# Patient Record
Sex: FEMALE | Race: BLACK OR AFRICAN AMERICAN | Employment: FULL TIME | URBAN - METROPOLITAN AREA
[De-identification: names, ages, dates, MRNs, and addresses within clinical notes are randomized per-mention and may not be internally consistent; named-entity substitution may affect disease eponyms.]

---

## 2017-03-11 ENCOUNTER — HOSPITAL ENCOUNTER (EMERGENCY)
Age: 27
Discharge: HOME | End: 2017-03-11
Attending: EMERGENCY MEDICINE
Payer: SELF-PAY

## 2017-03-11 ENCOUNTER — APPOINTMENT (OUTPATIENT)
Dept: GENERAL RADIOLOGY | Age: 27
End: 2017-03-11
Attending: EMERGENCY MEDICINE
Payer: SELF-PAY

## 2017-03-11 VITALS
HEIGHT: 62 IN | WEIGHT: 126.2 LBS | TEMPERATURE: 97.7 F | BODY MASS INDEX: 23.22 KG/M2 | DIASTOLIC BLOOD PRESSURE: 90 MMHG | OXYGEN SATURATION: 99 % | RESPIRATION RATE: 16 BRPM | SYSTOLIC BLOOD PRESSURE: 129 MMHG | HEART RATE: 99 BPM

## 2017-03-11 DIAGNOSIS — S60.221A CONTUSION OF RIGHT HAND, INITIAL ENCOUNTER: Primary | ICD-10-CM

## 2017-03-11 PROCEDURE — 73130 X-RAY EXAM OF HAND: CPT

## 2017-03-11 PROCEDURE — 99283 EMERGENCY DEPT VISIT LOW MDM: CPT

## 2017-03-11 PROCEDURE — L3908 WHO COCK-UP NONMOLDE PRE OTS: HCPCS

## 2017-03-11 RX ORDER — IBUPROFEN 600 MG/1
600 TABLET ORAL
Qty: 20 TAB | Refills: 0 | OUTPATIENT
Start: 2017-03-11 | End: 2020-07-10

## 2017-03-11 NOTE — ED NOTES
[facility] discharge instructions with patient. Opportunity for questions provided, patient verbalized understanding. Ambulatory with steady gait out of department.

## 2017-03-11 NOTE — ED TRIAGE NOTES
Patient arriving for c/o injury to RIGHT hand after punching wall. Patient reports abrasions to hand. Unsure if Tetanus is up to date. Reports being unable to make a fist with hand. Reports numbness and tingling to RIGHT hand. Able to move thumb without difficulty.

## 2017-03-11 NOTE — LETTER
[address] 
[address] [facility] 7 [id number] 
[phone] Work/School Note Date: 3/11/2017 To Whom It May concern: 
 
Clari Garsia was seen and treated today in the emergency room by the following provider(s): 
Attending Provider: David Jha MD 
Physician Assistant: Magnolia Fox PA-C. Clari Garsia may return to work on 3/14/17.  
 
Sincerely, 
 
 
 
 
Magnolia Fox PA-C

## 2017-03-11 NOTE — DISCHARGE INSTRUCTIONS
We hope that we have addressed all of your medical concerns. The examination and treatment you received in the Emergency Department were for an emergent problem and were not intended as complete care. It is important that you follow up with your healthcare provider(s) for ongoing care. If your symptoms worsen or do not improve as expected, and you are unable to reach your usual health care provider(s), you should return to the Emergency Department. Today's healthcare is undergoing tremendous change, and patient satisfaction surveys are one of the many tools to assess the quality of medical care. You may receive a survey from the [facility] regarding your experience in the Emergency Department. I hope that your experience has been completely positive, particularly the medical care that I provided. As such, please participate in the survey; anything less than excellent does not meet my expectations or intentions. [facility] and [address] [place] [address] participate in nationally recognized quality of care measures. If your blood pressure is greater than 120/80, as reported below, we urge that you seek medical care to address the potential of high blood pressure, commonly known as hypertension. Hypertension can be hereditary or can be caused by certain medical conditions, pain, stress, or \"white coat syndrome. \"       Please make an appointment with your health care provider(s) for follow up of your Emergency Department visit. VITALS:   Patient Vitals for the past 8 hrs:   Temp Pulse Resp BP SpO2   03/11/17 0016 97.7 °F (36.5 °C) 99 16 129/90 99 %          Thank you for allowing us to provide you with medical care today. We realize that you have many choices for your emergency care needs. Please choose us in the future for any continued health care needs. Mckenna Jacobson, [facility].   Office: [phone]            No results found for this or any previous visit (from the past 24 hour(s)). Xr Hand Rt Min 3 V    Result Date: 3/11/2017  EXAM:  CR hand min 3 views right INDICATION:  Right hand pain after punching wall COMPARISON: 4/11/2012. TECHNIQUE: 3 views right hand FINDINGS: There is no acute fracture or dislocation. The soft tissues are unremarkable. IMPRESSION: No acute abnormality. Hand Bruises: Care Instructions  Your Care Instructions  Bruises, or contusions, can happen as a result of an impact or fall. Most people think of a bruise as a black-and-blue spot. This happens when small blood vessels get torn and leak blood under the skin. The bruise may turn purplish black, reddish blue, or yellowish green as it heals. But bones and muscles can also get bruised. This may damage the hand but not cause a bruise that you can see. Most bruises aren't serious and will go away on their own in 2 to 4 weeks. But sometimes a more serious hand injury might not heal on its own. Tell your doctor if you have new symptoms or your injury is not getting better over time. You may have tests to see if you have bone or nerve damage. These tests may include X-rays, a CT scan, or an MRI. If you damaged bones or muscles, you may need more treatment. The doctor has checked you carefully, but problems can develop later. If you notice any problems or new symptoms, get medical treatment right away. Follow-up care is a key part of your treatment and safety. Be sure to make and go to all appointments, and call your doctor if you are having problems. It's also a good idea to know your test results and keep a list of the medicines you take. How can you care for yourself at home? · Put ice or a cold pack on the hand for 10 to 20 minutes at a time. Put a thin cloth between the ice and your skin. · Prop up your hand on a pillow when you ice it or anytime you sit or lie down during the next 3 days.  Try to keep your hand above the level of your heart. This will help reduce swelling. · Be safe with medicines. Read and follow all instructions on the label. ¨ If the doctor gave you a prescription medicine for pain, take it as prescribed. ¨ If you are not taking a prescription pain medicine, ask your doctor if you can take an over-the-counter medicine. · Be sure to follow your doctor's advice about moving and exercising your injured hand. When should you call for help? Call your doctor now or seek immediate medical care if:  · Your pain gets worse. · You have new or worse swelling. · You have tingling, weakness, or numbness in the area near the bruise. · The area near the bruise is cold or pale. · You have symptoms of infection, such as:  ¨ Increased pain, swelling, warmth, or redness. ¨ Red streaks leading from the area. ¨ Pus draining from the area. ¨ A fever. Watch closely for changes in your health, and be sure to contact your doctor if:  · You do not get better as expected. Where can you learn more? Go to http://daniel-oren.info/. Enter E844 in the search box to learn more about \"Hand Bruises: Care Instructions. \"  Current as of: May 27, 2016  Content Version: 11.1  © 7274-7391 [employer]. Care instructions adapted under license by [employer] (which disclaims liability or warranty for this information). If you have questions about a medical condition or this instruction, always ask your healthcare professional. [place] [address] any warranty or liability for your use of this information.

## 2017-03-11 NOTE — ED PROVIDER NOTES
HPI Comments: 61-year-old female presents to the emergency department for evaluation of a right hand pain. Patient states she punched a wall one day prior to arrival. Patient has had continued aching throbbing pain rated 9/10 in the right hand. It does not radiate. No wrist pain forearm or elbow pain. No nausea vomiting. No numbness or tingling. Patient has not taken anything for the pain. Pain is worse when she presses on her hand or moves her fingers. No alleviating factors  No HI or SI. Social hx  +smoker  Social alcohol    Patient is a 23 y.o. female presenting with hand injury. The history is provided by the patient. Hand Injury    Pertinent negatives include no back pain and no neck pain. History reviewed. No pertinent past medical history. History reviewed. No pertinent surgical history. History reviewed. No pertinent family history. Social History     Social History    Marital status: SINGLE     Spouse name: N/A    Number of children: N/A    Years of education: N/A     Occupational History    Not on file. Social History Main Topics    Smoking status: Former Smoker     Packs/day: 0.25    Smokeless tobacco: Not on file    Alcohol use Yes      Comment: socially    Drug use: No    Sexual activity: Yes     Partners: Female, Male     Birth control/ protection: Injection     Other Topics Concern    Not on file     Social History Narrative         ALLERGIES: Review of patient's allergies indicates no known allergies. Review of Systems   Constitutional: Negative for chills and fever. HENT: Negative for congestion. Respiratory: Negative for cough and chest tightness. Cardiovascular: Negative for chest pain. Gastrointestinal: Negative for nausea and vomiting. Musculoskeletal: Negative for back pain and neck pain. Right hand pain   Psychiatric/Behavioral: Negative for self-injury and suicidal ideas. The patient is not nervous/anxious.         Vitals:    03/11/17 0016   BP: 129/90   Pulse: 99   Resp: 16   Temp: 97.7 °F (36.5 °C)   SpO2: 99%   Weight: 57.2 kg (126 lb 3.2 oz)   Height: 5' 2\" (1.575 m)            Physical Exam   Constitutional: She is oriented to person, place, and time. She appears well-developed and well-nourished. No distress. HENT:   Head: Normocephalic and atraumatic. Eyes: Conjunctivae and EOM are normal. Pupils are equal, round, and reactive to light. Neck: Normal range of motion. Neck supple. NO midline tenderness to palpation of cspine   Cardiovascular: Normal rate and regular rhythm. Pulmonary/Chest: Effort normal. No respiratory distress. Musculoskeletal: Normal range of motion. She exhibits no edema or tenderness. Right  Wrist: no redness, warmth,  No obvious deformity. No soft tissue swelling, no ecchymosis. No pain with palpation along over carpals. No snuffbox or axial load pain. 2+ radial pulse, cap refill brisk < 3 seconds. Pt tender along dorsum of hand over all metacarpals. No deformity. No soft tissue swelling. Sensation intact to all fingers. Pain with flexion of fingers. 5/5 ab/adduction of fingers  Neurovascularly intact   Neurological: She is alert and oriented to person, place, and time. No cranial nerve deficit. She exhibits normal muscle tone. Coordination normal.   Skin: Skin is warm and dry. No erythema. Psychiatric: She has a normal mood and affect. Her behavior is normal. Judgment and thought content normal.   Nursing note and vitals reviewed. MDM  Number of Diagnoses or Management Options  Contusion of right hand, initial encounter:   Diagnosis management comments: 21-year-old female with right hand injury after punching a wall. No open wounds or deformities. Patient is neurovascularly intact  Contusion versus sprain versus fracture  Plan: X-ray    X-rays negative for acute bony process. I will place the hand in a splint for comfort the patient follow up with orthopedic doctor.     Patient's results have been reviewed with them. Patient and/or family have verbally conveyed their understanding and agreement of the patient's signs, symptoms, diagnosis, treatment and prognosis and additionally agree to follow up as recommended or return to the Emergency Room should their condition change prior to follow-up. Discharge instructions have also been provided to the patient with some educational information regarding their diagnosis as well a list of reasons why they would want to return to the ER prior to their follow-up appointment should their condition change. Amount and/or Complexity of Data Reviewed  Tests in the radiology section of CPT®: ordered and reviewed (xr right hand)    Patient Progress  Patient progress: stable    ED Course       Procedures           Pt case including HPI, PE, and all available lab and radiology results has been discussed with attending physician. Opportunity to evaluate patient has been provided to ER attending. Discharge and prescription plan has been agreed upon.

## 2020-07-10 ENCOUNTER — HOSPITAL ENCOUNTER (EMERGENCY)
Age: 30
Discharge: HOME | End: 2020-07-10
Attending: EMERGENCY MEDICINE
Payer: SELF-PAY

## 2020-07-10 VITALS
RESPIRATION RATE: 16 BRPM | OXYGEN SATURATION: 97 % | DIASTOLIC BLOOD PRESSURE: 72 MMHG | SYSTOLIC BLOOD PRESSURE: 137 MMHG | HEART RATE: 81 BPM | TEMPERATURE: 98.3 F

## 2020-07-10 DIAGNOSIS — K04.01 ACUTE PULPITIS: Primary | ICD-10-CM

## 2020-07-10 DIAGNOSIS — K04.7 TOOTH ABSCESS: ICD-10-CM

## 2020-07-10 PROCEDURE — 99282 EMERGENCY DEPT VISIT SF MDM: CPT

## 2020-07-10 RX ORDER — IBUPROFEN 800 MG/1
800 TABLET ORAL
Qty: 20 TAB | Refills: 0 | Status: SHIPPED | OUTPATIENT
Start: 2020-07-10 | End: 2020-07-17

## 2020-07-10 RX ORDER — ACETAMINOPHEN 500 MG
1000 TABLET ORAL
Qty: 20 TAB | Refills: 0 | OUTPATIENT
Start: 2020-07-10 | End: 2020-08-27

## 2020-07-10 RX ORDER — PENICILLIN V POTASSIUM 500 MG/1
500 TABLET, FILM COATED ORAL 4 TIMES DAILY
Qty: 28 TAB | Refills: 0 | Status: SHIPPED | OUTPATIENT
Start: 2020-07-10 | End: 2020-07-17

## 2020-07-10 NOTE — ED NOTES
I have reviewed discharge instructions with the patient. The patient verbalized understanding.  Patient provided with work note

## 2020-07-10 NOTE — ED PROVIDER NOTES
The history is provided by the patient. Dental Pain    This is a new problem. The current episode started more than 2 days ago. The problem occurs constantly. The problem has not changed since onset. The pain is located in the left lower mouth. The quality of the pain is aching. The pain is moderate. Associated symptoms include headaches. There was no fever. She has tried nothing for the symptoms. The patient has no cardiac history. History reviewed. No pertinent past medical history. History reviewed. No pertinent surgical history. History reviewed. No pertinent family history.     Social History     Socioeconomic History    Marital status: SINGLE     Spouse name: Not on file    Number of children: Not on file    Years of education: Not on file    Highest education level: Not on file   Occupational History    Not on file   Social Needs    Financial resource strain: Not on file    Food insecurity     Worry: Not on file     Inability: Not on file    Transportation needs     Medical: Not on file     Non-medical: Not on file   Tobacco Use    Smoking status: Former Smoker     Packs/day: 0.25   Substance and Sexual Activity    Alcohol use: Yes     Comment: socially    Drug use: No    Sexual activity: Yes     Partners: Female, Male     Birth control/protection: Injection   Lifestyle    Physical activity     Days per week: Not on file     Minutes per session: Not on file    Stress: Not on file   Relationships    Social connections     Talks on phone: Not on file     Gets together: Not on file     Attends Buddhism service: Not on file     Active member of club or organization: Not on file     Attends meetings of clubs or organizations: Not on file     Relationship status: Not on file    Intimate partner violence     Fear of current or ex partner: Not on file     Emotionally abused: Not on file     Physically abused: Not on file     Forced sexual activity: Not on file   Other Topics Concern    Not on file   Social History Narrative    Not on file         ALLERGIES: Patient has no known allergies. Review of Systems   HENT: Positive for dental problem. All other systems reviewed and are negative. Vitals:    07/10/20 0756   BP: 137/72   Pulse: 81   Resp: 16   Temp: 98.3 °F (36.8 °C)   SpO2: 97%            Physical Exam  Vitals signs and nursing note reviewed. Constitutional:       General: She is not in acute distress. Appearance: She is well-developed. HENT:      Head: Normocephalic and atraumatic. Mouth/Throat:      Dentition: Abnormal dentition (with crwn fracture in center of tooth in longitudinal orientation and exposed pulp). Eyes:      Conjunctiva/sclera: Conjunctivae normal.   Neck:      Musculoskeletal: Neck supple. Cardiovascular:      Rate and Rhythm: Normal rate and regular rhythm. Pulmonary:      Effort: Pulmonary effort is normal. No respiratory distress. Abdominal:      General: There is no distension. Musculoskeletal: Normal range of motion. General: No deformity. Skin:     General: Skin is warm and dry. Neurological:      Mental Status: She is alert. Cranial Nerves: No cranial nerve deficit. Psychiatric:         Behavior: Behavior normal.          MDM     After thorough assessment of mouth and teeth, Pt appears to have dental caries with abscess that is not approachable via gingival I&D. No evidence of extension into mandible/maxilla, no sarah's angina, no systemic symptoms to suggest sepsis or endocarditis. Pt will be placed on antimicrobials and offered alveolar block for comfort in interim.  Pt was advised that definitive care for dental abscess is with a dental health professional.     Procedures

## 2020-07-10 NOTE — LETTER
[address] 
[address] [id number] 
[phone] Work/School Note Date: 7/10/2020 To Whom It May concern: 
 
Junie Abdalla was seen and treated today in the emergency room by the following provider(s): 
Attending Provider: Candy Rudd MD.   
 
Junie Abdalla may return to work on 7/11/2020.  
 
Sincerely, 
 
 
 
 
Fredi Arias MD

## 2020-08-27 ENCOUNTER — HOSPITAL ENCOUNTER (EMERGENCY)
Age: 30
Discharge: HOME | End: 2020-08-27
Attending: EMERGENCY MEDICINE

## 2020-08-27 VITALS
HEART RATE: 104 BPM | SYSTOLIC BLOOD PRESSURE: 132 MMHG | DIASTOLIC BLOOD PRESSURE: 91 MMHG | RESPIRATION RATE: 16 BRPM | BODY MASS INDEX: 25.03 KG/M2 | HEIGHT: 62 IN | WEIGHT: 136 LBS | OXYGEN SATURATION: 96 % | TEMPERATURE: 99.8 F

## 2020-08-27 DIAGNOSIS — K08.89 PAIN, DENTAL: Primary | ICD-10-CM

## 2020-08-27 PROCEDURE — 99282 EMERGENCY DEPT VISIT SF MDM: CPT

## 2020-08-27 RX ORDER — PENICILLIN V POTASSIUM 500 MG/1
500 TABLET, FILM COATED ORAL 4 TIMES DAILY
Qty: 40 TAB | Refills: 0 | Status: SHIPPED | OUTPATIENT
Start: 2020-08-27 | End: 2020-09-06

## 2020-08-27 NOTE — LETTER
[address] 
[address] Work/School Note Date: 8/27/2020 To Whom It May concern: 
 
Rosie Arzate was seen and treated today in the emergency room by the following provider(s): 
Attending Provider: Jewel Phillips MD 
Physician Assistant: Jazmine Dasilva PA-C. Rosie Arzate may return to work on 8/29/2020.  
 
Sincerely, 
 
 
 
 
Alfred Newby PA-C

## 2020-08-27 NOTE — DISCHARGE INSTRUCTIONS
Ibuprofen or tylenol as needed for pain. Penicillin:1 pill every 6 hours until complete. Please follow-up with one of the provided dental clinics for further treatment of your tooth pain. Return to ER for any fever over 101, facial swelling, facial, redness. Dental Pain: After Your Visit  Your Care Instructions  The most common cause of dental pain is tooth decay. It can also be caused by an infection of the tooth (abscess) or gum, a tooth that has not broken all the way through the gum (impacted tooth), or a problem with the nerve-filled center of the tooth. Follow-up care is a key part of your treatment and safety. Be sure to make and go to all appointments, and call your doctor if you are having problems. Its also a good idea to know your test results and keep a list of the medicines you take. How can you care for yourself at home? · Contact a dentist for follow-up care. · Put ice or a cold pack on the outside of your mouth for 10 to 20 minutes at a time to reduce pain and swelling. Put a thin cloth between the ice and your skin. · Take an over-the-counter pain medicine, such as acetaminophen (Tylenol), ibuprofen (Advil, Motrin), or naproxen (Aleve). Read and follow all instructions on the label. · Do not take two or more pain medicines at the same time unless the doctor told you to. Many pain medicines have acetaminophen, which is Tylenol. Too much acetaminophen (Tylenol) can be harmful. · Rinse your mouth with warm salt water every 2 hours to help relieve pain and swelling from an infected tooth. Mix 1 teaspoon of salt in 8 ounces of water. · If your doctor prescribed antibiotics, take them as directed. Do not stop taking them just because you feel better. You need to take the full course of antibiotics. When should you call for help? Call your doctor now or seek immediate medical care if:  · You have signs of infection, such as:  ¨ Increased pain, swelling, warmth, or redness.   ¨ Pus draining from the gum, tooth, or face. ¨ A fever. Watch closely for changes in your health, and be sure to contact your doctor if:  · You do not get better as expected. Where can you learn more? Go to [employer]  Enter V264 in the search box to learn more about \"Dental Pain: After Your Visit. \"   © 3418-7381 [employer]. Care instructions adapted under license by [facility] (which disclaims liability or warranty for this information). This care instruction is for use with your licensed healthcare professional. If you have questions about a medical condition or this instruction, always ask your healthcare professional. [place] [address] any warranty or liability for your use of this information. Content Version: 83.7.923109; Last Revised: May 17, 2013    Emergency Dental Care        [facility]  [address] [place] [address] [address]   Monday-Friday: 8am-4pm  Phone: [phone] of Dentistry Urgent [address] [place] [address], [address] [place] [address] [address]  Phone: [phone] to confirm a time for emergency treatment  Pediatrics: [phone]  $75 per tooth, extractions only     [facility]  [address], [address] [place] [address]   Phone [phone] or [phone]  Hours [id number] (extractions)  Simple tooth extraction: $60 per tooth, $55 per x-ray     [facility])  [facility] only  Phone: [phone], leave message saying you need an appointment to register  Hours: Wed 6-9p       Non-Urgent [facility] ([facility])  [address] [place] [address]  Phone: [phone]     [address] [facility]   Dental Clinic: [phone]  Oral Surgery Clinic: [phone]

## 2020-08-27 NOTE — ED PROVIDER NOTES
23-year-old female presents emergency room for evaluation of 1 month of dental pain. Patient reports pain in the right upper tooth. Patient reports she has tooth decay. She states she has not seen a dentist.  No fevers. Subjective chills. No nausea or vomiting. No chest pain, shortness of breath or difficulty breathing. No headache. Pain radiates to the right side of the face. Pain is described as aching. Patient states she tried ibuprofen with no relief. No difference in hot or cold. No alleviating factors. Patient seen in the emergency room July 10 2020, for dental pain as well. Social hx  +smoker  Social alcohol      The history is provided by the patient. No  was used. Dental Pain             No past medical history on file. No past surgical history on file. No family history on file.     Social History     Socioeconomic History    Marital status: SINGLE     Spouse name: Not on file    Number of children: Not on file    Years of education: Not on file    Highest education level: Not on file   Occupational History    Not on file   Social Needs    Financial resource strain: Not on file    Food insecurity     Worry: Not on file     Inability: Not on file    Transportation needs     Medical: Not on file     Non-medical: Not on file   Tobacco Use    Smoking status: Former Smoker     Packs/day: 0.25   Substance and Sexual Activity    Alcohol use: Yes     Comment: socially    Drug use: No    Sexual activity: Yes     Partners: Female, Male     Birth control/protection: Injection   Lifestyle    Physical activity     Days per week: Not on file     Minutes per session: Not on file    Stress: Not on file   Relationships    Social connections     Talks on phone: Not on file     Gets together: Not on file     Attends Voodoo service: Not on file     Active member of club or organization: Not on file     Attends meetings of clubs or organizations: Not on file Relationship status: Not on file    Intimate partner violence     Fear of current or ex partner: Not on file     Emotionally abused: Not on file     Physically abused: Not on file     Forced sexual activity: Not on file   Other Topics Concern    Not on file   Social History Narrative    Not on file         ALLERGIES: Patient has no known allergies. Review of Systems   Constitutional: Negative for chills and fever. HENT: Positive for dental problem. Negative for congestion, facial swelling, mouth sores, rhinorrhea, sore throat and trouble swallowing. Respiratory: Negative for cough and shortness of breath. Gastrointestinal: Negative for abdominal pain, nausea and vomiting. Musculoskeletal: Negative for arthralgias, myalgias, neck pain and neck stiffness. Skin: Negative for rash and wound. Neurological: Negative for dizziness and headaches. All other systems reviewed and are negative. Vitals:    08/27/20 0857   BP: (!) 132/91   Pulse: (!) 104   Resp: 16   Temp: 99.8 °F (37.7 °C)   SpO2: 96%   Weight: 61.7 kg (136 lb)   Height: 5' 2\" (1.575 m)            Physical Exam  Vitals signs and nursing note reviewed. Constitutional:       General: She is not in acute distress. Appearance: Normal appearance. She is normal weight. She is not ill-appearing, toxic-appearing or diaphoretic. HENT:      Head: Normocephalic and atraumatic. Nose: Nose normal.      Mouth/Throat:      Mouth: Mucous membranes are moist.      Pharynx: Oropharynx is clear. Comments: Uvula midline, no trismus, no drooling, no submandibular swelling. Pt tender at tooth #3 . Dental decay present. No gum swelling, no palpable abscess. No facial swelling, no facial redness, no facial cellulitis. No mastoid tenderness. Pt able to open and close mouth    Eyes:      Conjunctiva/sclera: Conjunctivae normal.      Pupils: Pupils are equal, round, and reactive to light.    Neck:      Musculoskeletal: Normal range of motion and neck supple. No neck rigidity or muscular tenderness. Cardiovascular:      Rate and Rhythm: Normal rate and regular rhythm. Pulmonary:      Effort: Pulmonary effort is normal.      Breath sounds: Normal breath sounds. Musculoskeletal: Normal range of motion. Skin:     General: Skin is warm and dry. Neurological:      General: No focal deficit present. Mental Status: She is alert and oriented to person, place, and time. Psychiatric:         Mood and Affect: Mood normal.         Behavior: Behavior normal.         Thought Content: Thought content normal.         Judgment: Judgment normal.          MDM  Number of Diagnoses or Management Options  Pain, dental:   Diagnosis management comments: Patient with dental pain x  1 month. Pt afebrile. No fever in ER. No facial swelling, no facial redness or warmth, no facial cellulitis. No palpable abscess. P: penicillin, pain control, dental follow-up. Patient's results have been reviewed with them. Patient and/or family have verbally conveyed their understanding and agreement of the patient's signs, symptoms, diagnosis, treatment and prognosis and additionally agree to follow up as recommended or return to the Emergency Room should their condition change prior to follow-up. Discharge instructions have also been provided to the patient with some educational information regarding their diagnosis as well a list of reasons why they would want to return to the ER prior to their follow-up appointment should their condition change. Procedures               Pt case including HPI, PE, and all available lab and radiology results has been discussed with attending physician. Opportunity to evaluate patient has been provided to ER attending. Discharge and prescription plan has been agreed upon.

## 2023-12-25 NOTE — ED TRIAGE NOTES
Triage: Pt arrives from home with CC of dental pain x1 month. Pt reports the pain become excruciating last night. She reports that she has an abscess as well. It is on the top right near the roof of her mouth. Has not seen a dentist as she does not have insurance. Took 800mg around 5pm without relief.
98

## 2024-06-21 ENCOUNTER — APPOINTMENT (OUTPATIENT)
Facility: HOSPITAL | Age: 34
End: 2024-06-21
Payer: COMMERCIAL

## 2024-06-21 ENCOUNTER — HOSPITAL ENCOUNTER (EMERGENCY)
Facility: HOSPITAL | Age: 34
Discharge: HOME | End: 2024-06-21
Payer: COMMERCIAL

## 2024-06-21 VITALS
RESPIRATION RATE: 14 BRPM | BODY MASS INDEX: 28.67 KG/M2 | DIASTOLIC BLOOD PRESSURE: 94 MMHG | WEIGHT: 161.82 LBS | OXYGEN SATURATION: 99 % | HEIGHT: 63 IN | HEART RATE: 85 BPM | SYSTOLIC BLOOD PRESSURE: 123 MMHG | TEMPERATURE: 98.1 F

## 2024-06-21 DIAGNOSIS — M25.541 PAIN IN JOINT OF RIGHT HAND: Primary | ICD-10-CM

## 2024-06-21 PROCEDURE — 73130 X-RAY EXAM OF HAND: CPT

## 2024-06-21 PROCEDURE — 6360000002 HC RX W HCPCS

## 2024-06-21 PROCEDURE — 99283 EMERGENCY DEPT VISIT LOW MDM: CPT

## 2024-06-21 RX ORDER — DEXAMETHASONE 4 MG/1
10 TABLET ORAL ONCE
Status: COMPLETED | OUTPATIENT
Start: 2024-06-21 | End: 2024-06-21

## 2024-06-21 RX ORDER — PREDNISONE 20 MG/1
40 TABLET ORAL DAILY
Qty: 10 TABLET | Refills: 0 | Status: SHIPPED | OUTPATIENT
Start: 2024-06-21 | End: 2024-06-26

## 2024-06-21 RX ORDER — CYCLOBENZAPRINE HCL 10 MG
10 TABLET ORAL 3 TIMES DAILY PRN
Qty: 21 TABLET | Refills: 0 | Status: SHIPPED | OUTPATIENT
Start: 2024-06-21 | End: 2024-07-01

## 2024-06-21 RX ADMIN — DEXAMETHASONE 10 MG: 4 TABLET ORAL at 15:25

## 2024-06-21 ASSESSMENT — PAIN SCALES - GENERAL: PAINLEVEL_OUTOF10: 9

## 2024-06-21 NOTE — ED PROVIDER NOTES
[facility] EMERGENCY DEPT  EMERGENCY DEPARTMENT ENCOUNTER       Pt Name: Chula Lua  MRN: [id number]  Birthdate 1990  Date of evaluation: 6/21/2024  Provider: ROSENDO Baker - CNP   PCP: Snehal Comer MD  Note Started:  3:31 PM EDT 6/21/24     CHIEF COMPLAINT       Chief Complaint   Patient presents with    Finger Pain     Right thumb pain noticed minor pains while at work. Then when she went home the pain is throbbing. No known injury-area that hurts the most is the sams space area. Pt has it wrapped with lashawn and splint.         HISTORY OF PRESENT ILLNESS: 1 or more elements      History From: Patient  HPI Limitations: None     Chula Lua is a 34 y.o. female who presents ***     Nursing Notes were all reviewed and agreed with or any disagreements were addressed in the HPI.     REVIEW OF SYSTEMS      Review of Systems     Positives and Pertinent negatives as per HPI.    PAST HISTORY     Past Medical History:  History reviewed. No pertinent past medical history.    Past Surgical History:  History reviewed. No pertinent surgical history.    Family History:  History reviewed. No pertinent family history.    Social History:  Social History     Tobacco Use    Smoking status: Former     Current packs/day: 0.25     Types: Cigarettes   Substance Use Topics    Alcohol use: Yes    Drug use: No       Allergies:  No Known Allergies    CURRENT MEDICATIONS      Discharge Medication List as of 6/21/2024  3:35 PM          PHYSICAL EXAM      ED Triage Vitals   Enc Vitals Group      BP 06/21/24 1436 (!) 123/94      Pulse 06/21/24 1436 85      Respirations 06/21/24 1436 14      Temp 06/21/24 1436 98.1 °F (36.7 °C)      Temp Source 06/21/24 1436 Oral      SpO2 06/21/24 1436 99 %      Weight - Scale 06/21/24 1436 73.4 kg (161 lb 13.1 oz)      Height 06/21/24 1447 1.6 m (5' 3\")      Head Circumference --       Peak Flow --       Pain Score --       Pain Loc --       Pain Edu? --       Excl. in GC? --

## 2024-06-21 NOTE — ED NOTES
Thumb spica splint applied to R thumb and wrist. Pt discharged by Thea SHIPLEY. Discharge instructions discussed and pt given opportunity to ask questions. Pt ambulatory out of ED